# Patient Record
Sex: MALE | Race: AMERICAN INDIAN OR ALASKA NATIVE
[De-identification: names, ages, dates, MRNs, and addresses within clinical notes are randomized per-mention and may not be internally consistent; named-entity substitution may affect disease eponyms.]

---
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
## 2018-01-03 ENCOUNTER — HOSPITAL ENCOUNTER (EMERGENCY)
Dept: HOSPITAL 42 - ED | Age: 18
Discharge: LEFT BEFORE DISCHARGE | End: 2018-01-03
Payer: COMMERCIAL

## 2018-01-03 DIAGNOSIS — Z02.89: Primary | ICD-10-CM

## 2018-01-03 DIAGNOSIS — J34.9: ICD-10-CM

## 2018-04-11 ENCOUNTER — HOSPITAL ENCOUNTER (EMERGENCY)
Dept: HOSPITAL 42 - ED | Age: 18
Discharge: HOME | End: 2018-04-11
Payer: COMMERCIAL

## 2018-04-11 VITALS
SYSTOLIC BLOOD PRESSURE: 116 MMHG | HEART RATE: 65 BPM | RESPIRATION RATE: 18 BRPM | OXYGEN SATURATION: 100 % | DIASTOLIC BLOOD PRESSURE: 75 MMHG

## 2018-04-11 VITALS — TEMPERATURE: 98.5 F

## 2018-04-11 DIAGNOSIS — B00.1: ICD-10-CM

## 2018-04-11 DIAGNOSIS — J11.1: Primary | ICD-10-CM

## 2018-04-11 DIAGNOSIS — R05: ICD-10-CM

## 2018-04-11 NOTE — ED PDOC
Arrival/HPI





- General


Chief Complaint: Flu-like Symptoms


Time Seen by Provider: 04/11/18 09:08


Historian: Patient





- History of Present Illness


Narrative History of Present Illness (Text): 





04/11/18 10:10


17-year-old male presents today with a four-day history of cough and nasal 

congestion sore throat and fatigue. Patient denies dizziness or weakness. 

Denies chest pain or shortness of breath. Patient complaining of nasal 

congestion. Patient states that he has a sore throat. Patient states he is 

coughing up green-yellow phlegm. Patient denies a history of smoking. Denies 

sick contacts. pt denies abdominal pain.  No medications have been taken at 

home. Patient complaining of subjective fevers. No other complaints


Time/Duration: Other (4 days)


Symptom Onset: Gradual


Symptom Course: Unchanged


Quality: Aching


Severity Level: Mild





Past Medical History





- Provider Review


Nursing Documentation Reviewed: Yes





- Travel History


Have you recently traveled outside [place] w/in the past 3 mons?: No





- Infectious Disease


Hx of Infectious Diseases: None





- Tetanus Immunization


Tetanus Immunization: Unknown





- Psychiatric


Hx Substance Use: No





- Anesthesia


Hx Anesthesia: No





Family/Social History





- Physician Review


Nursing Documentation Reviewed: Yes


Family/Social History: Unknown Family HX


Smoking Status: Never Smoked


Hx Alcohol Use: Yes


Hx Substance Use: No





Allergies/Home Meds


Allergies/Adverse Reactions: 


Allergies





No Known Allergies Allergy (Verified 04/11/18 09:14)


 











Review of Systems





- Review of Systems


Constitutional: Fevers.  absent: Fatigue


ENT: Sore Throat, Sinus Congestion


Respiratory: Cough


Cardiovascular: absent: Chest Pain, Palpitations


Gastrointestinal: Nausea.  absent: Abdominal Pain, Constipation, Diarrhea, 

Vomiting


Genitourinary Male: absent: Dysuria, Frequency, Hematuria


Musculoskeletal: absent: Arthralgias, Back Pain, Neck Pain


Skin: absent: Rash, Pruritis


Neurological: absent: Headache, Dizziness





Physical Exam


Vital Signs Reviewed: Yes


Vital Signs











  Temp Pulse Resp BP Pulse Ox


 


 04/11/18 11:03   65  18  116/75  100


 


 04/11/18 09:12  98.5 F  81  16  120/66  97











Temperature: Afebrile


Blood Pressure: Normal


Pulse: Regular


Respiratory Rate: Normal


Appearance: Positive for: Well-Appearing, Non-Toxic, Comfortable


Pain Distress: None


Mental Status: Positive for: Alert and Oriented X 3





- Systems Exam


Head: Present: Atraumatic


Pupils: Present: PERRL


Extroacular Muscles: Present: EOMI


Conjunctiva: Present: Normal


Ears: Present: Normal, NORMAL TM, Normal Canal.  No: Erythema, TM Bulging


Mouth: Present: Moist Mucous Membranes, Other (there are few pinpoint vesicles 

on right side of upper lip.).  No: Drooling, Trismus


Pharnyx: Present: ERYTHEMA, Other (+ post nasal drip. ).  No: EXUDATE, TONSILS 

ENLARGED, Peritonsilar Swelling, Uvular Deviation, Muffled/Hoarse Voice, Strider


Nose (External): Present: Atraumatic


Nose (Internal): Present: Normal Inspection


Neck: Present: Normal Range of Motion, Trachea Midline.  No: Meningeal Signs, 

MIDLINE TENDERNESS, Paraspinal Tenderness


Respiratory/Chest: Present: Clear to Auscultation, Good Air Exchange.  No: 

Respiratory Distress, Accessory Muscle Use


Cardiovascular: Present: Regular Rate and Rhythm


Abdomen: No: Tenderness, Distention, Rebound, Guarding


Upper Extremity: Present: Normal ROM


Lower Extremity: Present: Normal ROM


Neurological: Present: GCS=15, Speech Normal


Skin: Present: Warm, Dry, Normal Color.  No: Rashes


Psychiatric: Present: Alert, Oriented x 3





Medical Decision Making


ED Course and Treatment: 





04/11/18 10:14


Patient is nontoxic well-appearing. C/o flu-like symptoms. 





tylenol PO 








rapid flu; negative








cxr: no infiltrate, no effusion








Tamiflu po


zithromax po 





Patient reassessment: Pt feeling better; vitals stable. 





discussed all results with patient. 








I advised follow up with primary care physician within the next 2 days. I 

advised increase fluids and return if symptoms worsen persist or if new 

symptoms develop








Patient verbalizes understanding of discharge instructions and need for 

immediate followup.








all aspects of this case were discussed the attending of record. 








IMPRESSION; Cough, sore throat, flu like symptoms, cold sore


Motrin one tablet every 6 hours as needed for pain


Tamiflu: 1 capsule twice daily 5 days


zithromax daily x 4 days. 


Continue using abreva for Cold sore.


Increase fluids


Followup with primary care physician the next 2 days


Return if symptoms worsen persist or if new symptoms develop: Continued high 

fevers, dizziness, weakness, chest pain or shortness of breath vomiting/diarrhea

, or if any other concerning symptoms develop








- Lab Interpretations


Lab Results: 


 Lab Results





04/11/18 09:40: Influenza Typ A,B (EIA) Negative for flu a/b











- RAD Interpretation


Radiology Orders: 








04/11/18 10:09


CHEST TWO VIEWS (PA/LAT) [RAD] Stat 














- Medication Orders


Current Medication Orders: 











Discontinued Medications





Acetaminophen (Tylenol 325mg Tab)  975 mg PO STAT STA


   Stop: 04/11/18 10:15


   Last Admin: 04/11/18 10:42  Dose: 975 mg





MAR Pain/Vitals


 Document     04/11/18 10:42  CARMEN  (Rec: 04/11/18 10:43  CARMEN  [id number])


     Pain Reassessment


      Is This A Pain ReAssessment?               Yes


     Presence of Pain


      Presence of Pain                           Yes


     Pain Scale Used


      Pain Scale Used                            Numeric


     Location


      Pain Location Body Site                    Throat


      Intensity                                  2


      Scale Used                                 Numeric














Disposition/Present on Arrival





- Present on Arrival


Any Indicators Present on Arrival: No


History of DVT/PE: No


History of Uncontrolled Diabetes: No


Urinary Catheter: No


History of Decub. Ulcer: No


History Surgical Site Infection Following: None





- Disposition


Have Diagnosis and Disposition been Completed?: Yes


Diagnosis: 


 Cough, Sore throat, Flu-like symptoms, Cold sore





Disposition: HOME/ ROUTINE


Disposition Time: 11:33


Patient Plan: Discharge


Patient Problems: 


 Current Active Problems











Problem Status Onset


 


Cold sore Acute  


 


Cough Acute  


 


Flu-like symptoms Acute  


 


Sore throat Acute  











Condition: GOOD


Discharge Instructions (ExitCare):  Cough in Adults, Sore Throat, Adult (DC)


Additional Instructions: 


Motrin one tablet every 6 hours as needed for pain/fever


Tamiflu: 1 capsule twice daily 5 days


zithromax daily x 4 days. 


Continue using abreva for Cold sore.


Increase fluids


Followup with primary care physician the next 2 days


Return if symptoms worsen persist or if new symptoms develop: Continued high 

fevers, dizziness, weakness, chest pain or shortness of breath vomiting/diarrhea

, or if any other concerning symptoms develop


Prescriptions: 


Azithromycin [Zithromax] 250 mg PO DAILY #4 tab


Ibuprofen [Motrin] 600 mg PO Q6H PRN #20 tab


 PRN Reason: pain/fever reduction


Oseltamivir [Tamiflu] 75 mg PO BID #10 cap


Referrals: 


Alanis Callejas MD [Staff Provider] - Follow up with primary


Reymundo Maya DO [Staff Provider] - Follow up with primary


[facility] [Outside] - Follow up with primary


Forms:  [facility] (English), WORK NOTE